# Patient Record
Sex: MALE | Race: WHITE | NOT HISPANIC OR LATINO | URBAN - METROPOLITAN AREA
[De-identification: names, ages, dates, MRNs, and addresses within clinical notes are randomized per-mention and may not be internally consistent; named-entity substitution may affect disease eponyms.]

---

## 2018-12-14 ENCOUNTER — OFFICE VISIT (OUTPATIENT)
Dept: URBAN - METROPOLITAN AREA CLINIC 32 | Facility: CLINIC | Age: 63
End: 2018-12-14
Payer: COMMERCIAL

## 2018-12-14 DIAGNOSIS — H52.4 PRESBYOPIA: Primary | ICD-10-CM

## 2018-12-14 PROCEDURE — 92014 COMPRE OPH EXAM EST PT 1/>: CPT | Performed by: OPTOMETRIST

## 2018-12-14 PROCEDURE — 92310 CONTACT LENS FITTING OU: CPT | Performed by: OPTOMETRIST

## 2018-12-14 PROCEDURE — 92015 DETERMINE REFRACTIVE STATE: CPT | Performed by: OPTOMETRIST

## 2018-12-14 ASSESSMENT — INTRAOCULAR PRESSURE
OD: 12
OS: 12

## 2018-12-14 ASSESSMENT — VISUAL ACUITY
OS: 20/25
OD: 20/20

## 2018-12-14 NOTE — IMPRESSION/PLAN
Impression: Presbyopia: H52.4. Plan: Finalized new glasses Rx. Patient education on appropriate options of eye glasses. Finalized Contact lens Rx. Patient education on care and management of contact lenses. Return to clinic in 1 year for complete eye exam, refraction and contact lens fitting.

## 2019-12-30 ENCOUNTER — OFFICE VISIT (OUTPATIENT)
Dept: URBAN - METROPOLITAN AREA CLINIC 32 | Facility: CLINIC | Age: 64
End: 2019-12-30
Payer: COMMERCIAL

## 2019-12-30 PROCEDURE — 92014 COMPRE OPH EXAM EST PT 1/>: CPT | Performed by: OPTOMETRIST

## 2019-12-30 ASSESSMENT — KERATOMETRY
OS: 43.63
OD: 43.38

## 2019-12-30 ASSESSMENT — INTRAOCULAR PRESSURE
OD: 15
OS: 14

## 2019-12-30 ASSESSMENT — VISUAL ACUITY
OD: 20/20
OS: 20/20

## 2024-04-03 ENCOUNTER — OFFICE VISIT (OUTPATIENT)
Dept: URBAN - METROPOLITAN AREA CLINIC 32 | Facility: CLINIC | Age: 69
End: 2024-04-03
Payer: COMMERCIAL

## 2024-04-03 DIAGNOSIS — H52.4 PRESBYOPIA: Primary | ICD-10-CM

## 2024-04-03 DIAGNOSIS — H04.123 DRY EYE SYNDROME: ICD-10-CM

## 2024-04-03 PROCEDURE — 92004 COMPRE OPH EXAM NEW PT 1/>: CPT | Performed by: OPTOMETRIST

## 2024-04-03 ASSESSMENT — KERATOMETRY
OS: 43.88
OD: 43.88

## 2024-04-03 ASSESSMENT — VISUAL ACUITY
OD: 20/20
OS: 20/20

## 2024-04-03 ASSESSMENT — INTRAOCULAR PRESSURE
OD: 15
OS: 15